# Patient Record
Sex: MALE | Race: AMERICAN INDIAN OR ALASKA NATIVE
[De-identification: names, ages, dates, MRNs, and addresses within clinical notes are randomized per-mention and may not be internally consistent; named-entity substitution may affect disease eponyms.]

---

## 2019-04-13 ENCOUNTER — HOSPITAL ENCOUNTER (EMERGENCY)
Dept: HOSPITAL 5 - ED | Age: 29
Discharge: HOME | End: 2019-04-13
Payer: COMMERCIAL

## 2019-04-13 VITALS — SYSTOLIC BLOOD PRESSURE: 149 MMHG | DIASTOLIC BLOOD PRESSURE: 95 MMHG
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
## 2019-04-13 DIAGNOSIS — S60.221A: ICD-10-CM
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
## 2019-04-13 DIAGNOSIS — S63.501A: Primary | ICD-10-CM

## 2019-04-13 NOTE — XRAY REPORT
PROCEDURE: XR HAND 3+V RT 

 

TECHNIQUE:  3 views of the right hand 

 

HISTORY: pain after hitting wall 

 

COMPARISONS: None.  

 

FINDINGS: 

 

There is normal alignment without acute fracture or dislocation. The joint spaces are preserved. The 
overlying soft tissues are intact. 

 

IMPRESSION:  

 

No acute bony abnormality of the right hand. 

 

This document is electronically signed by Rachel Gonzalez MD., April 13 2019 10:54:54 AM ET

## 2019-04-13 NOTE — XRAY REPORT
PROCEDURE: XR WRIST 3+V RT 

 

TECHNIQUE:  3 views of the right wrist 

 

HISTORY: right wrist pain 

 

COMPARISONS: None.  

 

FINDINGS: 

 

There is normal alignment without acute fracture or dislocation. The joint spaces are preserved. The 
overlying soft tissues are intact. 

 

IMPRESSION:  

 

No acute bony abnormality of the right wrist. 

 

This document is electronically signed by Rachel Gonzalez MD., April 13 2019 11:36:03 AM ET

## 2019-04-13 NOTE — EMERGENCY DEPARTMENT REPORT
Upper Extremity





- HPI


Chief Complaint: Extremity Injury, Upper


Stated Complaint: NO FEELING IN RT HAND


Time Seen by Provider: 04/13/19 10:21


Upper Extremity: Right Shoulder, Right Wrist, Right Hand


Occurred When: 1 Day


Mechanism: Other (punched a wall)


Severity: moderate


Symptoms: Yes Pain with Movement, Yes Limited Range of Movement, No Deformity, 

No Numbness, No Weakness, No Swelling, No Bruising/Ecchymosis, No Laceration or 

Abrasion


Other History: Morro is a 27 yo male who injured his right wrist and hand after

punching a wall yesterday.  He was angry at the mother of his child.  In anger, 

he punched a wall at [employer].  Severe pain at the latera portion of hand. 

When he squeezes a fist, severe pain develops in wrist.





ED Review of Systems


ROS: 


Stated complaint: NO FEELING IN RT HAND


Other details as noted in HPI





Constitutional: denies: fever, malaise


Musculoskeletal: denies: joint swelling, arthralgia


Skin: denies: rash, lesions, change in color


Neurological: denies: numbness, paresthesias





ED Past Medical Hx





- Past Medical History


Previous Medical History?: No





- Surgical History


Past Surgical History?: No





- Social History


Smoking Status: Never Smoker


Substance Use Type: Marijuana





- Medications


Home Medications: 


                                Home Medications











 Medication  Instructions  Recorded  Confirmed  Last Taken  Type


 


Acetaminophen/Codeine [Tylenol 1 tab PO Q6H PRN #12 tab 11/08/18  Unknown Rx





/Codeine # 3 tab]     


 


Sulfamethoxazole/Trimethoprim 1 each PO BID #14 tablet 11/08/18  Unknown Rx





[Bactrim DS TAB]     


 


HYDROcodone/APAP 5-325 [[place]ams 1 each PO Q6HR PRN #10 tablet 04/13/19  Unknown Rx





5/325]     














Upper Extremity Exam





- Exam


General: 


Vital signs noted. No distress. Alert and acting appropriately.





Head and Torso: No HEENT Abnormality


Shoulder Exam: No Shoulder Tenderness, No Clavicle Tenderness, No Normal Range 

of Motion in Shoulder


Wrist: Yes Wrist Tenderness, Yes Snuffbox Tenderness, Yes Pain with Axial Thumb 

Compression, No Normal ROM in Wrist (limited ROM), No Wrist Deformity


Hand: Yes Hand Tenderness, Yes Digit Tenderness, No Hand Deformity, No Normal 

ROM in Digit(s), No Digit(s) Deformity, No Tendon Dysfunction


CMS Exam: Yes Normal Distal Pulses, Yes Normal Capillary Refill, Yes Normal 

Distal Sensation, No Broken Skin





ED Course


                                   Vital Signs











  04/13/19





  09:27


 


Temperature 98.0 F


 


Pulse Rate 60


 


Respiratory 16





Rate 


 


Blood Pressure 149/95


 


O2 Sat by Pulse 100





Oximetry 














ED Medical Decision Making





- Radiology Data


Radiology results: report reviewed





According to radiology report, radiographs of the right hand and wrist are 

normal without acute process.





- Medical Decision Making





Right ulnar gutter splint was applied to the affected extremity under my 

supervision.  After application the extremity was neurovascularly intact with 

acceptable alignment.





Due to severe pain and point tenderness of the right wrist, I strongly 

recommended keeping splint in place until evaluated by orthopedics specialist.  

Prescribed Adams.


Critical care attestation.: 


If time is entered above; I have spent that time in minutes in the direct care 

of this critically ill patient, excluding procedure time.








ED Disposition


Clinical Impression: 


 Right wrist sprain, Contusion of right hand





Disposition: DC-01 TO HOME OR SELFCARE


Is pt being admited?: No


Does the pt Need Aspirin: No


Condition: Stable


Instructions:  Wrist Injury (ED), Hand Sprain (ED), Splint Care (ED)


Additional Instructions: 


Please keep splint in place until evaluated by orthopedic specialist


Prescriptions: 


HYDROcodone/APAP 5-325 [Adams 5/325] 1 each PO Q6HR PRN #10 tablet


 PRN Reason: Pain


Referrals: 


NICOLÁS HEBERT MD [Staff Physician] - 3-5 Days


Forms:  Work/School Release Form(ED)

## 2019-04-15 ENCOUNTER — HOSPITAL ENCOUNTER (EMERGENCY)
Dept: HOSPITAL 5 - ED | Age: 29
Discharge: HOME | End: 2019-04-15
Payer: COMMERCIAL

## 2019-04-15 DIAGNOSIS — F12.10: ICD-10-CM

## 2019-04-15 DIAGNOSIS — M25.531: Primary | ICD-10-CM

## 2019-04-15 PROCEDURE — 99282 EMERGENCY DEPT VISIT SF MDM: CPT

## 2019-04-15 NOTE — EMERGENCY DEPARTMENT REPORT
ED General Adult HPI





- General


Chief complaint: Extremity Injury, Upper


Stated complaint: R ARM PAIN/TIGHTNESS


Time Seen by Provider: 04/15/19 09:58


Source: patient


Mode of arrival: Ambulatory


Limitations: No Limitations





- History of Present Illness


Initial comments: 





28 y.o male who reports splint on Lt. arm is getting tighter, and increased 

pain. seen here x2 days ago for Lt. wrist pain. no finger discoloration, no 

increased swelling.


Severity scale (0 -10): 7





- Related Data


                                  Previous Rx's











 Medication  Instructions  Recorded  Last Taken  Type


 


Acetaminophen/Codeine [Tylenol 1 tab PO Q6H PRN #12 tab 11/08/18 Unknown Rx





/Codeine # 3 tab]    


 


Sulfamethoxazole/Trimethoprim 1 each PO BID #14 tablet 11/08/18 Unknown Rx





[Bactrim DS TAB]    


 


HYDROcodone/APAP 5-325 [[place] 1 each PO Q6HR PRN #10 tablet 04/13/19 Unknown Rx





5/325]    


 


Ibuprofen [Motrin] 800 mg PO Q8HR PRN #15 tablet 04/15/19 Unknown Rx











                                    Allergies











Allergy/AdvReac Type Severity Reaction Status Date / Time


 


No Known Allergies Allergy   Verified 04/13/19 09:23














ED Review of Systems


ROS: 


Stated complaint: R ARM PAIN/TIGHTNESS


Other details as noted in HPI





Comment: All other systems reviewed and negative


ENT: denies: ear pain, throat pain


Cardiovascular: denies: chest pain, dyspnea on exertion


Gastrointestinal: denies: abdominal pain, nausea, vomiting


Musculoskeletal: other (left wrist pain)





ED Past Medical Hx





- Social History


Smoking Status: Never Smoker


Substance Use Type: Marijuana





- Medications


Home Medications: 


                                Home Medications











 Medication  Instructions  Recorded  Confirmed  Last Taken  Type


 


Acetaminophen/Codeine [Tylenol 1 tab PO Q6H PRN #12 tab 11/08/18  Unknown Rx





/Codeine # 3 tab]     


 


Sulfamethoxazole/Trimethoprim 1 each PO BID #14 tablet 11/08/18  Unknown Rx





[Bactrim DS TAB]     


 


HYDROcodone/APAP 5-325 [[place] 1 each PO Q6HR PRN #10 tablet 04/13/19  Unknown Rx





5/325]     


 


Ibuprofen [Motrin] 800 mg PO Q8HR PRN #15 tablet 04/15/19  Unknown Rx














ED Physical Exam





- General


Limitations: No Limitations


General appearance: alert





- Head


Head exam: Present: atraumatic





- Eye


Eye exam: Present: normal appearance





- ENT


ENT exam: Present: normal exam, normal orophraynx, mucous membranes moist





- Neck


Neck exam: Present: normal inspection





- Respiratory


Respiratory exam: Present: normal lung sounds bilaterally





- Cardiovascular


Cardiovascular Exam: Present: regular rate, normal rhythm





- GI/Abdominal


GI/Abdominal exam: Present: soft, normal bowel sounds





- Rectal


Rectal exam: Present: deferred





- Extremities Exam


Extremities exam: Present: other (left wrist tenderness. no signs of compartment

 syndrome.)





ED Course


                                   Vital Signs











  04/15/19 04/15/19





  08:37 10:09


 


Temperature 97.8 F 


 


Pulse Rate 75 70


 


Respiratory 16 20





Rate  


 


Blood Pressure 143/100 129/90





[Right]  


 


O2 Sat by Pulse 100 99





Oximetry  














ED Medical Decision Making





- Medical Decision Making





splint adjusted feels better, d/c home with ortho f/u.





- Differential Diagnosis


fracture, cellulitis


Critical care attestation.: 


If time is entered above; I have spent that time in minutes in the direct care 

of this critically ill patient, excluding procedure time.








ED Disposition


Clinical Impression: 


 Wrist pain, right





Disposition: DC-01 TO HOME OR SELFCARE


Is pt being admited?: No


Does the pt Need Aspirin: No


Condition: Stable


Instructions:  Wrist Injury (ED)


Prescriptions: 


Ibuprofen [Motrin] 800 mg PO Q8HR PRN #15 tablet


 PRN Reason: Pain , Severe (7-10)


Referrals: 


[facility] MD [Primary Care Provider] - 3-5 Days

## 2019-04-16 VITALS — SYSTOLIC BLOOD PRESSURE: 129 MMHG | DIASTOLIC BLOOD PRESSURE: 90 MMHG
